# Patient Record
Sex: FEMALE | Race: WHITE | Employment: UNEMPLOYED | URBAN - METROPOLITAN AREA
[De-identification: names, ages, dates, MRNs, and addresses within clinical notes are randomized per-mention and may not be internally consistent; named-entity substitution may affect disease eponyms.]

---

## 2022-02-28 ENCOUNTER — HOSPITAL ENCOUNTER (EMERGENCY)
Age: 57
Discharge: HOME | End: 2022-02-28
Attending: EMERGENCY MEDICINE

## 2022-02-28 VITALS
TEMPERATURE: 97.8 F | RESPIRATION RATE: 18 BRPM | WEIGHT: 232 LBS | HEIGHT: 65 IN | DIASTOLIC BLOOD PRESSURE: 86 MMHG | OXYGEN SATURATION: 98 % | BODY MASS INDEX: 38.65 KG/M2 | SYSTOLIC BLOOD PRESSURE: 134 MMHG | HEART RATE: 85 BPM

## 2022-02-28 DIAGNOSIS — I47.1 PAROXYSMAL SUPRAVENTRICULAR TACHYCARDIA (HCC): ICD-10-CM

## 2022-02-28 DIAGNOSIS — R00.2 PALPITATIONS: Primary | ICD-10-CM

## 2022-02-28 LAB
ANION GAP SERPL CALCULATED.3IONS-SCNC: 12 MMOL/L (ref 3–16)
BASOPHILS ABSOLUTE: 0.1 K/UL (ref 0–0.2)
BASOPHILS RELATIVE PERCENT: 1.3 %
BUN BLDV-MCNC: 14 MG/DL (ref 7–20)
CALCIUM SERPL-MCNC: 9.2 MG/DL (ref 8.3–10.6)
CHLORIDE BLD-SCNC: 107 MMOL/L (ref 99–110)
CO2: 23 MMOL/L (ref 21–32)
CREAT SERPL-MCNC: 0.7 MG/DL (ref 0.6–1.1)
EKG ATRIAL RATE: 79 BPM
EKG DIAGNOSIS: NORMAL
EKG P AXIS: 8 DEGREES
EKG P-R INTERVAL: 178 MS
EKG Q-T INTERVAL: 402 MS
EKG QRS DURATION: 84 MS
EKG QTC CALCULATION (BAZETT): 460 MS
EKG R AXIS: -35 DEGREES
EKG T AXIS: 10 DEGREES
EKG VENTRICULAR RATE: 79 BPM
EOSINOPHILS ABSOLUTE: 0.1 K/UL (ref 0–0.6)
EOSINOPHILS RELATIVE PERCENT: 1.5 %
GFR AFRICAN AMERICAN: >60
GFR NON-AFRICAN AMERICAN: >60
GLUCOSE BLD-MCNC: 97 MG/DL (ref 70–99)
HCT VFR BLD CALC: 42.4 % (ref 36–48)
HEMOGLOBIN: 14.6 G/DL (ref 12–16)
LYMPHOCYTES ABSOLUTE: 1.4 K/UL (ref 1–5.1)
LYMPHOCYTES RELATIVE PERCENT: 17.3 %
MCH RBC QN AUTO: 32 PG (ref 26–34)
MCHC RBC AUTO-ENTMCNC: 34.4 G/DL (ref 31–36)
MCV RBC AUTO: 93 FL (ref 80–100)
MONOCYTES ABSOLUTE: 0.6 K/UL (ref 0–1.3)
MONOCYTES RELATIVE PERCENT: 6.9 %
NEUTROPHILS ABSOLUTE: 6 K/UL (ref 1.7–7.7)
NEUTROPHILS RELATIVE PERCENT: 73 %
PDW BLD-RTO: 12.9 % (ref 12.4–15.4)
PLATELET # BLD: 316 K/UL (ref 135–450)
PMV BLD AUTO: 7.8 FL (ref 5–10.5)
POTASSIUM REFLEX MAGNESIUM: 4 MMOL/L (ref 3.5–5.1)
RBC # BLD: 4.56 M/UL (ref 4–5.2)
SODIUM BLD-SCNC: 142 MMOL/L (ref 136–145)
TROPONIN: <0.01 NG/ML
WBC # BLD: 8.2 K/UL (ref 4–11)

## 2022-02-28 PROCEDURE — 80048 BASIC METABOLIC PNL TOTAL CA: CPT

## 2022-02-28 PROCEDURE — 93010 ELECTROCARDIOGRAM REPORT: CPT | Performed by: INTERNAL MEDICINE

## 2022-02-28 PROCEDURE — 93005 ELECTROCARDIOGRAM TRACING: CPT | Performed by: EMERGENCY MEDICINE

## 2022-02-28 PROCEDURE — 84484 ASSAY OF TROPONIN QUANT: CPT

## 2022-02-28 PROCEDURE — 36415 COLL VENOUS BLD VENIPUNCTURE: CPT

## 2022-02-28 PROCEDURE — 99284 EMERGENCY DEPT VISIT MOD MDM: CPT

## 2022-02-28 PROCEDURE — 6370000000 HC RX 637 (ALT 250 FOR IP): Performed by: EMERGENCY MEDICINE

## 2022-02-28 PROCEDURE — 85025 COMPLETE CBC W/AUTO DIFF WBC: CPT

## 2022-02-28 RX ORDER — METOPROLOL SUCCINATE 25 MG/1
12.5 TABLET, EXTENDED RELEASE ORAL DAILY
Qty: 15 TABLET | Refills: 0 | Status: SHIPPED | OUTPATIENT
Start: 2022-02-28 | End: 2022-03-30

## 2022-02-28 RX ORDER — METOPROLOL SUCCINATE 25 MG/1
12.5 TABLET, EXTENDED RELEASE ORAL ONCE
Status: COMPLETED | OUTPATIENT
Start: 2022-02-28 | End: 2022-02-28

## 2022-02-28 RX ADMIN — METOPROLOL SUCCINATE 12.5 MG: 25 TABLET, EXTENDED RELEASE ORAL at 13:54

## 2022-02-28 ASSESSMENT — PAIN DESCRIPTION - ORIENTATION: ORIENTATION: RIGHT

## 2022-02-28 ASSESSMENT — PAIN DESCRIPTION - LOCATION: LOCATION: SHOULDER

## 2022-02-28 ASSESSMENT — PAIN SCALES - GENERAL: PAINLEVEL_OUTOF10: 4

## 2022-03-01 NOTE — ED PROVIDER NOTES
EMERGENCY DEPARTMENT PROVIDER NOTE    Patient Identification  Pt Name: Delma Reed  MRN: [id number]  [place] 1965  Date of evaluation: 2/28/2022  Provider: Kg Tuttle DO  PCP: Elodia Dubin, MD, MD    Chief Complaint  Irregular Heart Beat (Pt brought in by ems for SVT. PT has hx of SVT and was converted this time with 6mg of adenosine per ems. Pt also recevied 500 ml of NS bolus en route. Pt is a  from [place])      HPI  (History provided by patient)  This is a 55 y.o. female with pertinent past medical history of SVT who was brought in by EMS transportation for palpitations which began about an hour prior to arrival.  Associated with shortness of breath. Patient reports felt similar to past episodes of SVT, patient tried to do vagal maneuvers however was unsuccessful. On EMS arrival, an EKG was performed which was consistent with SVT and patient received 6 mg of adenosine with conversion to sinus rhythm. At time of arrival to the emergency department patient reports feeling much improved, states to me that feeling mildly fatigued is her only current symptom. Patient reports has been recommended for ablation in the past, it is also been suggested for her to take medication for paroxysmal SVT however she has not yet done this. Patient works as a , [place]. She denies any fevers, chills, chest pain or abdominal pain. No weakness or dizziness. States drank a large amount of alcohol last night for the first time in a while as she was at a friend's house for dinner party. Denies any history of coronary artery disease or VTE. Fortunato GUEVARA    Const:  No fevers, no chills, no generalized weakness  Skin:  No rash, no lesions  Eyes:  No visual changes, no blurry or double vision, no pain  ENT:  No sore throat, no difficulty swallowing, no ear pain, no sinus pain or congestion  Card:  No chest pain, no palpitations (currently resolved), no edema  Resp:  No shortness of breath, no cough, no wheezing  Abd:  No abdominal pain, no nausea, no vomiting, no diarrhea  Genitourinary:  No dysuria, no hematuria, no vaginal discharge, no vaginal bleeding  MSK:  No joint pain, no myalgia  Neuro:  No focal weakness, no headache, no paresthesia    All other systems reviewed and negative unless otherwise noted in HPI      I have reviewed the following nursing documentation:  Allergies: Patient has no known allergies. Past medical history:   Past Medical History:   Diagnosis Date    SVT (supraventricular tachycardia) ([facility] [address]      Past surgical history:   Past Surgical History:   Procedure Laterality Date    CHOLECYSTECTOMY      HYSTERECTOMY      partial-still has ovaries       Home medications:   Discharge Medication List as of 2/28/2022  2:00 PM          Social history:      Family history:  No family history on file. Exam  ED Triage Vitals   BP Temp Temp Source Pulse Resp SpO2 Height Weight   02/28/22 1126 02/28/22 1120 02/28/22 1120 02/28/22 1120 02/28/22 1120 02/28/22 1120 02/28/22 1120 02/28/22 1120   134/86 97.8 °F (36.6 °C) Oral 77 20 100 % 5' 5\" (1.651 m) 232 lb (105.2 kg)     Nursing note and vitals reviewed. Constitutional: Well developed, well nourished. Non-toxic in appearance. BMI 38.61  HENT:      Head: Normocephalic and atraumatic. Ears: External ears normal.      Nose: Nose normal.     Mouth: Membrane mucosa moist and pink. Eyes: Anicteric sclera. No discharge. Neck: Supple. Trachea midline. Cardiovascular: RRR; no murmurs, rubs, or gallops. Pulmonary/Chest: Effort normal. No respiratory distress. CTAB. No stridor. No wheezes. No rales. Abdominal: Soft. No distension. Musculoskeletal: Moves all extremities. No gross deformity. Neurological: Alert and oriented. Face symmetric. Speech is clear. Skin: Warm and dry. No rash. Psychiatric: Normal mood and affect.  Behavior is normal.    Procedures      EKG    EKG was reviewed by emergency department physician in the absence of a cardiologist    Narrow complex sinus rhythm, rate 79, normal axis, normal AR and QRS intervals, normal Qtc, no ST elevations or depressions, normal t-wave morphology, impression NSR, no STEMI, no comparison available      Radiology  No orders to display       Labs  Results for orders placed or performed during the hospital encounter of 02/28/22   CBC with Auto Differential   Result Value Ref Range    WBC 8.2 4.0 - 11.0 K/uL    RBC 4.56 4.00 - 5.20 M/uL    Hemoglobin 14.6 12.0 - 16.0 g/dL    Hematocrit 42.4 36.0 - 48.0 %    MCV 93.0 80.0 - 100.0 fL    MCH 32.0 26.0 - 34.0 pg    MCHC 34.4 31.0 - 36.0 g/dL    RDW 12.9 12.4 - 15.4 %    Platelets 292 374 - 025 K/uL    MPV 7.8 5.0 - 10.5 fL    Neutrophils % 73.0 %    Lymphocytes % 17.3 %    Monocytes % 6.9 %    Eosinophils % 1.5 %    Basophils % 1.3 %    Neutrophils Absolute 6.0 1.7 - 7.7 K/uL    Lymphocytes Absolute 1.4 1.0 - 5.1 K/uL    Monocytes Absolute 0.6 0.0 - 1.3 K/uL    Eosinophils Absolute 0.1 0.0 - 0.6 K/uL    Basophils Absolute 0.1 0.0 - 0.2 K/uL   Basic Metabolic Panel w/ Reflex to MG   Result Value Ref Range    Sodium 142 136 - 145 mmol/L    Potassium reflex Magnesium 4.0 3.5 - 5.1 mmol/L    Chloride 107 99 - 110 mmol/L    CO2 23 21 - 32 mmol/L    Anion Gap 12 3 - 16    Glucose 97 70 - 99 mg/dL    BUN 14 7 - 20 mg/dL    CREATININE 0.7 0.6 - 1.1 mg/dL    GFR Non-African American >60 >60    GFR African American >60 >60    Calcium 9.2 8.3 - 10.6 mg/dL   Troponin   Result Value Ref Range    Troponin <0.01 <0.01 ng/mL   EKG 12 Lead   Result Value Ref Range    Ventricular Rate 79 BPM    Atrial Rate 79 BPM    P-R Interval 178 ms    QRS Duration 84 ms    Q-T Interval 402 ms    QTc Calculation (Bazett) 460 ms    P Axis 8 degrees    R Axis -35 degrees    T Axis 10 degrees    Diagnosis       Normal sinus rhythmIncomplete right bundle branch blockLeft axis deviationMinimal voltage criteria for LVH, may be normal variantAbnormal ECGConfirmed by Emi Kennedy ([id number]) on 2/28/2022 7:17:36 PM       Screenings   [place] Coma Scale  Eye Opening: Spontaneous  Best Verbal Response: Oriented  Best Motor Response: Obeys commands  [place] Coma Scale Score: 15       MDM and ED Course    Patient afebrile and nontoxic. No distress. Per EMS report, patient with SVT on arrival and was given 6 mg of adenosine with conversion, patient reports feeling much improved afterwards. EKG on arrival to emergency department is normal sinus rhythm without evidence of acute ischemia. No malignant dysrhythmia currently. Troponin normal, no chest pain, overall very low suspicion for ACS. Nothing to suggest dissection. Laboratory work-up is reassuring without evidence of endorgan dysfunction or clinically significant electrolyte derangement. Pulmonary embolism considered, patient with recurrent paroxysmal SVT over the past several years, no hypoxia, no increased work of breathing, very low suspicion for pulmonary embolism and this is not the most likely diagnosis. Nothing to suggest infectious etiology. Clinically patient is without findings to suggest congestive heart failure. lengthy discussion was held with patient and her spouse at bedside regarding recurrent episodes of SVT, patient is agreeable to start low-dose metoprolol, specific precautions regarding this medication were discussed. I also recommend close cardiology and primary care follow-up within the next couple days and patient verbalized her understanding. Patient is agreeable with plan to discharge to home and feels very comfortable doing so. Strict return precautions were discussed. Final Impression  1. Palpitations    2. Paroxysmal supraventricular tachycardia (HCC)        Blood pressure 134/86, pulse 85, temperature 97.8 °F (36.6 °C), temperature source Oral, resp. rate 18, height 5' 5\" (1.651 m), weight 232 lb (105.2 kg), SpO2 98 %.      Disposition:  DISPOSITION Decision To Discharge 02/28/2022 01:57:00 PM      Patient Referrals:  Vik Robledo MD  [address] [facility], [facility] [address]    In 2 days      Olga Cisneros MD  [address] [facility] [facility] [address]    In 3 days  Cardiology - for your palpitations and SVT      Discharge Medications:  Discharge Medication List as of 2/28/2022  2:00 PM      START taking these medications    Details   metoprolol succinate (TOPROL XL) 25 MG extended release tablet Take 0.5 tablets by mouth daily, Disp-15 tablet, R-[id number]             Discontinued Medications:  Discharge Medication List as of 2/28/2022  2:00 PM          This chart was generated using the [address] [place] [address] dictation system. I created this record but it may contain dictation errors given the limitations of this technology.     Anel Valderrama DO (electronically signed)  Attending Emergency Physician       Anel Valderrama DO  02/28/22 2032